# Patient Record
Sex: FEMALE | Race: WHITE | NOT HISPANIC OR LATINO
[De-identification: names, ages, dates, MRNs, and addresses within clinical notes are randomized per-mention and may not be internally consistent; named-entity substitution may affect disease eponyms.]

---

## 2018-04-25 ENCOUNTER — TRANSCRIPTION ENCOUNTER (OUTPATIENT)
Age: 33
End: 2018-04-25

## 2021-02-03 ENCOUNTER — TRANSCRIPTION ENCOUNTER (OUTPATIENT)
Age: 36
End: 2021-02-03

## 2021-02-21 ENCOUNTER — TRANSCRIPTION ENCOUNTER (OUTPATIENT)
Age: 36
End: 2021-02-21

## 2021-08-11 ENCOUNTER — TRANSCRIPTION ENCOUNTER (OUTPATIENT)
Age: 36
End: 2021-08-11

## 2021-09-16 ENCOUNTER — TRANSCRIPTION ENCOUNTER (OUTPATIENT)
Age: 36
End: 2021-09-16

## 2021-09-24 ENCOUNTER — TRANSCRIPTION ENCOUNTER (OUTPATIENT)
Age: 36
End: 2021-09-24

## 2022-05-09 ENCOUNTER — APPOINTMENT (OUTPATIENT)
Dept: ORTHOPEDIC SURGERY | Facility: CLINIC | Age: 37
End: 2022-05-09
Payer: COMMERCIAL

## 2022-05-09 VITALS — HEIGHT: 62 IN | BODY MASS INDEX: 32.2 KG/M2 | WEIGHT: 175 LBS

## 2022-05-09 DIAGNOSIS — Z78.9 OTHER SPECIFIED HEALTH STATUS: ICD-10-CM

## 2022-05-09 DIAGNOSIS — M54.9 DORSALGIA, UNSPECIFIED: ICD-10-CM

## 2022-05-09 PROBLEM — Z00.00 ENCOUNTER FOR PREVENTIVE HEALTH EXAMINATION: Status: ACTIVE | Noted: 2022-05-09

## 2022-05-09 PROCEDURE — 99204 OFFICE O/P NEW MOD 45 MIN: CPT

## 2022-05-09 PROCEDURE — 72110 X-RAY EXAM L-2 SPINE 4/>VWS: CPT

## 2022-05-09 NOTE — IMAGING
[de-identified] : L spine\par \par Inspection: No rash or ecchymosis \par \par Palpation: No midline lumbar tenderness. No tenderness to palpation or spasm in bilateral thoracic and lumbar paraspinal musculature. No SI joint tenderness to palpation. No greater trochanter tenderness to palpation.\par \par ROM: Full with mild stiffness \par \par Strength: 5/5 bilateral hip flexors, knee extensors, ankle dorsiflexors, EHL, ankle plantarflexors \par \par Sensation: Sensation present to light touch bilateral L2-S1 distributions \par \par Provocative maneuvers: Negative R seated straight leg raise. + L seated straight leg raise.\par  [Straightening consistent with spasm] : Straightening consistent with spasm [No spinal deformity, fracture, lytic lesion, or marked single level collapse] : No spinal deformity, fracture, lytic lesion, or marked single level collapse [AP] : anteroposterior [There are no fractures, subluxations or dislocations. No significant abnormalities are seen] : There are no fractures, subluxations or dislocations. No significant abnormalities are seen [de-identified] : Large L L5 TP likely articulating with the sacrum

## 2022-05-09 NOTE — HISTORY OF PRESENT ILLNESS
[9] : 9 [5] : 5 [Sharp] : sharp [Shooting] : shooting [de-identified] : 05/09/2022 - 36 year old  female presents for lower back pain radiating to LLE (glute, posterior thigh, anterior/posterior leg below the knee, entire foot) X 1 year since giving birth. Associated N/T throughout LLE as well.  Denies specific injury. Aggravated  by bearing weight on LLE . Alleviated by Aleve/stretching. Denies prior lower back injuries/surgeries. Briefly did PT for lower back, but had difficulty fitting it into her schedule. \par  [FreeTextEntry5] : pt is here for back and left leg pain. pt says she thinks she developed sciatica a year ago after giving birth. daily pain, exercise sometimes helps it and sometimes worsens. aleve helps sometimes. pt stretches as well. pain when putting weight on left leg. worst pain on left buttocks area [FreeTextEntry7] : left leg, toes

## 2022-05-09 NOTE — ASSESSMENT
[FreeTextEntry1] : Initiate PT, meds, not breast feeding\par Will discuss imaging\par \par NSAIDs- Patient warned of risk of medication to GI tract, increased blood pressure, cardiac risk, and risk of fluid retention.  Advised to clear medication with internist or PCP if any concurrent health problem with heart, blood pressure, or GI system exists. \par Gabapentin- Patient advised of sedating effects, instructed not to drive, operate machinery, or take with other sedating medications. Advised of need to taper on/off medication and risk of abruptly stopping gabapentin.

## 2022-05-18 RX ORDER — GABAPENTIN 100 MG/1
100 CAPSULE ORAL
Qty: 60 | Refills: 2 | Status: ACTIVE | COMMUNITY
Start: 2022-05-09 | End: 1900-01-01

## 2022-06-17 ENCOUNTER — APPOINTMENT (OUTPATIENT)
Dept: ORTHOPEDIC SURGERY | Facility: CLINIC | Age: 37
End: 2022-06-17
Payer: COMMERCIAL

## 2022-06-17 VITALS — WEIGHT: 175 LBS | HEIGHT: 62 IN | BODY MASS INDEX: 32.2 KG/M2

## 2022-06-17 DIAGNOSIS — M54.50 LOW BACK PAIN, UNSPECIFIED: ICD-10-CM

## 2022-06-17 PROCEDURE — 99214 OFFICE O/P EST MOD 30 MIN: CPT

## 2022-06-17 RX ORDER — NAPROXEN 500 MG/1
500 TABLET ORAL
Qty: 60 | Refills: 0 | Status: DISCONTINUED | COMMUNITY
Start: 2022-05-09 | End: 2022-06-17

## 2022-06-17 RX ORDER — IBUPROFEN 800 MG/1
800 TABLET ORAL 3 TIMES DAILY
Qty: 90 | Refills: 0 | Status: ACTIVE | COMMUNITY
Start: 2022-06-17 | End: 1900-01-01

## 2022-06-17 NOTE — IMAGING
[de-identified] : L spine\par \par Inspection: No rash or ecchymosis \par \par Palpation: No midline lumbar tenderness. No tenderness to palpation or spasm in bilateral thoracic and lumbar paraspinal musculature. No SI joint tenderness to palpation. No greater trochanter tenderness to palpation.\par \par ROM: Full with mild stiffness \par \par Strength: 5/5 bilateral hip flexors, knee extensors, ankle dorsiflexors, EHL, ankle plantarflexors \par \par Sensation: Sensation present to light touch bilateral L2-S1 distributions \par \par Provocative maneuvers: Negative R seated straight leg raise. equivocal L seated straight leg raise.\par

## 2022-06-17 NOTE — HISTORY OF PRESENT ILLNESS
[Lower back] : lower back [9] : 9 [5] : 5 [Shooting] : shooting [Stabbing] : stabbing [de-identified] : 05/09/2022 - 36 year old  female presents for lower back pain radiating to LLE (glute, posterior thigh, anterior/posterior leg below the knee, entire foot) X 1 year since giving birth. Associated N/T throughout LLE as well.  Denies specific injury. Aggravated  by bearing weight on LLE . Alleviated by Aleve/stretching. Denies prior lower back injuries/surgeries. Briefly did PT for lower back, but had difficulty fitting it into her schedule. \par \par 6/17/22- LBP worse since last visit (9/10 in severity with activity). Continued radiation down LLE to toes, with numbness. Has been going to the chiropractor 2x a week for the past 6 weeks, without relief. \par  [] : Post Surgical Visit: no [FreeTextEntry5] : PT IS HERE FOR A FOLLOW UP AND STATES SHE HAS NO SIG CHANGE. [FreeTextEntry7] : down the LT leg and toes [de-identified] :  [de-identified] : x-ray

## 2022-06-17 NOTE — ASSESSMENT
[FreeTextEntry1] : Continue PT, meds, not breast feeding\par Has failed 6 weeks of conservative measures under my care, including meds and chiro. Lumbar MRI warranted to rule out HNP. \par \par NSAIDs- Patient warned of risk of medication to GI tract, increased blood pressure, cardiac risk, and risk of fluid retention.  Advised to clear medication with internist or PCP if any concurrent health problem with heart, blood pressure, or GI system exists. \par Gabapentin- Patient advised of sedating effects, instructed not to drive, operate machinery, or take with other sedating medications. Advised of need to taper on/off medication and risk of abruptly stopping gabapentin.

## 2022-06-20 ENCOUNTER — FORM ENCOUNTER (OUTPATIENT)
Age: 37
End: 2022-06-20

## 2022-06-21 ENCOUNTER — APPOINTMENT (OUTPATIENT)
Dept: MRI IMAGING | Facility: CLINIC | Age: 37
End: 2022-06-21
Payer: COMMERCIAL

## 2022-06-21 PROCEDURE — 72148 MRI LUMBAR SPINE W/O DYE: CPT

## 2022-08-02 ENCOUNTER — APPOINTMENT (OUTPATIENT)
Dept: ORTHOPEDIC SURGERY | Facility: CLINIC | Age: 37
End: 2022-08-02

## 2022-08-02 DIAGNOSIS — M62.830 MUSCLE SPASM OF BACK: ICD-10-CM

## 2022-08-02 DIAGNOSIS — M54.16 RADICULOPATHY, LUMBAR REGION: ICD-10-CM

## 2022-08-02 DIAGNOSIS — Q76.49 OTHER CONGENITAL MALFORMATIONS OF SPINE, NOT ASSOCIATED WITH SCOLIOSIS: ICD-10-CM

## 2022-08-02 PROCEDURE — 99214 OFFICE O/P EST MOD 30 MIN: CPT

## 2022-08-02 NOTE — HISTORY OF PRESENT ILLNESS
[Lower back] : lower back [Shooting] : shooting [Stabbing] : stabbing [10] : 10 [de-identified] : L MRI 6/21/22-\par Spinal Canal: Developmentally small spinal canal throughout the lumbar region.\par Sagittal alignment: Normal.\par Coronal alignment: Normal.\par L1-2, L2-3 and L3-4: Normal.\par L4-5: Mild degenerative disc disease with a left lateral recess disc herniation compressing the left L5 nerve root.\par L5-S1: Normal.\par Ind. 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 Associated N/T throughout LLE as well.  Denies specific injury. Aggravated  by bearing weight on LLE . Alleviated by Aleve/stretching. Denies prior lower back injuries/surgeries. Briefly did PT for lower back, but had difficulty fitting it into her schedule. \par \par 6/17/22- LBP worse since last visit (9/10 in severity with activity). Continued radiation down LLE to toes, with numbness. Has been going to the chiropractor 2x a week for the past 6 weeks, without relief. \par \par 8/2/22- LBP the same, with radiation down LLE to toes. \par  [] : Post Surgical Visit: no [FreeTextEntry5] : Pt is here for a follow up and states she has no sig change [FreeTextEntry7] : down the LT leg and toes [de-identified] :  [de-identified] : x-ray [de-identified] : MRI

## 2022-08-02 NOTE — ASSESSMENT
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  Advised to clear medication with internist or PCP if any concurrent health problem with heart, blood pressure, or GI system exists. \par Gabapentin- Patient advised of sedating effects, instructed not to drive, operate machinery, or take with other sedating medications. Advised of need to taper on/off medication and risk of abruptly stopping gabapentin.

## 2022-08-14 ENCOUNTER — NON-APPOINTMENT (OUTPATIENT)
Age: 37
End: 2022-08-14

## 2022-08-26 ENCOUNTER — APPOINTMENT (OUTPATIENT)
Dept: PAIN MANAGEMENT | Facility: CLINIC | Age: 37
End: 2022-08-26

## 2022-12-04 ENCOUNTER — NON-APPOINTMENT (OUTPATIENT)
Age: 37
End: 2022-12-04

## 2024-11-19 ENCOUNTER — NON-APPOINTMENT (OUTPATIENT)
Age: 39
End: 2024-11-19